# Patient Record
Sex: FEMALE | Race: BLACK OR AFRICAN AMERICAN | NOT HISPANIC OR LATINO | URBAN - METROPOLITAN AREA
[De-identification: names, ages, dates, MRNs, and addresses within clinical notes are randomized per-mention and may not be internally consistent; named-entity substitution may affect disease eponyms.]

---
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
## 2021-03-04 ENCOUNTER — EMERGENCY (EMERGENCY)
Facility: HOSPITAL | Age: 21
LOS: 1 days | Discharge: HOME | End: 2021-03-04
Attending: EMERGENCY MEDICINE | Admitting: EMERGENCY MEDICINE
Payer: COMMERCIAL

## 2021-03-04 VITALS
RESPIRATION RATE: 16 BRPM | OXYGEN SATURATION: 100 % | HEART RATE: 87 BPM | HEIGHT: 62.6 IN | DIASTOLIC BLOOD PRESSURE: 77 MMHG | SYSTOLIC BLOOD PRESSURE: 120 MMHG | TEMPERATURE: 98 F

## 2021-03-04 DIAGNOSIS — F32.9 MAJOR DEPRESSIVE DISORDER, SINGLE EPISODE, UNSPECIFIED: ICD-10-CM

## 2021-03-04 LAB
ALBUMIN SERPL ELPH-MCNC: 4.5 G/DL — SIGNIFICANT CHANGE UP (ref 3.3–5)
ALP SERPL-CCNC: 45 U/L — SIGNIFICANT CHANGE UP (ref 40–120)
ALT FLD-CCNC: 9 U/L — SIGNIFICANT CHANGE UP (ref 4–33)
AMPHET UR-MCNC: NEGATIVE — SIGNIFICANT CHANGE UP
ANION GAP SERPL CALC-SCNC: 13 MMOL/L — SIGNIFICANT CHANGE UP (ref 7–14)
APAP SERPL-MCNC: <15 UG/ML — SIGNIFICANT CHANGE UP (ref 15–25)
APPEARANCE UR: ABNORMAL
AST SERPL-CCNC: 13 U/L — SIGNIFICANT CHANGE UP (ref 4–32)
BACTERIA # UR AUTO: ABNORMAL
BARBITURATES UR SCN-MCNC: NEGATIVE — SIGNIFICANT CHANGE UP
BASOPHILS # BLD AUTO: 0.01 K/UL — SIGNIFICANT CHANGE UP (ref 0–0.2)
BASOPHILS NFR BLD AUTO: 0.2 % — SIGNIFICANT CHANGE UP (ref 0–2)
BENZODIAZ UR-MCNC: NEGATIVE — SIGNIFICANT CHANGE UP
BILIRUB SERPL-MCNC: <0.2 MG/DL — SIGNIFICANT CHANGE UP (ref 0.2–1.2)
BILIRUB UR-MCNC: NEGATIVE — SIGNIFICANT CHANGE UP
BUN SERPL-MCNC: 9 MG/DL — SIGNIFICANT CHANGE UP (ref 7–23)
CALCIUM SERPL-MCNC: 9.2 MG/DL — SIGNIFICANT CHANGE UP (ref 8.4–10.5)
CHLORIDE SERPL-SCNC: 104 MMOL/L — SIGNIFICANT CHANGE UP (ref 98–107)
CO2 SERPL-SCNC: 21 MMOL/L — LOW (ref 22–31)
COCAINE METAB.OTHER UR-MCNC: NEGATIVE — SIGNIFICANT CHANGE UP
COLOR SPEC: YELLOW — SIGNIFICANT CHANGE UP
CREAT SERPL-MCNC: 0.83 MG/DL — SIGNIFICANT CHANGE UP (ref 0.5–1.3)
CREATININE URINE RESULT, DAU: 146 MG/DL — SIGNIFICANT CHANGE UP
DIFF PNL FLD: ABNORMAL
EOSINOPHIL # BLD AUTO: 0.08 K/UL — SIGNIFICANT CHANGE UP (ref 0–0.5)
EOSINOPHIL NFR BLD AUTO: 1.7 % — SIGNIFICANT CHANGE UP (ref 0–6)
ETHANOL SERPL-MCNC: <10 MG/DL — SIGNIFICANT CHANGE UP
GLUCOSE SERPL-MCNC: 97 MG/DL — SIGNIFICANT CHANGE UP (ref 70–99)
GLUCOSE UR QL: NEGATIVE — SIGNIFICANT CHANGE UP
HCG SERPL-ACNC: <5 MIU/ML — SIGNIFICANT CHANGE UP
HCT VFR BLD CALC: 39.6 % — SIGNIFICANT CHANGE UP (ref 34.5–45)
HGB BLD-MCNC: 12.8 G/DL — SIGNIFICANT CHANGE UP (ref 11.5–15.5)
IANC: 2.17 K/UL — SIGNIFICANT CHANGE UP (ref 1.5–8.5)
IMM GRANULOCYTES NFR BLD AUTO: 0.2 % — SIGNIFICANT CHANGE UP (ref 0–1.5)
KETONES UR-MCNC: NEGATIVE — SIGNIFICANT CHANGE UP
LEUKOCYTE ESTERASE UR-ACNC: NEGATIVE — SIGNIFICANT CHANGE UP
LYMPHOCYTES # BLD AUTO: 1.95 K/UL — SIGNIFICANT CHANGE UP (ref 1–3.3)
LYMPHOCYTES # BLD AUTO: 42.2 % — SIGNIFICANT CHANGE UP (ref 13–44)
MCHC RBC-ENTMCNC: 28.4 PG — SIGNIFICANT CHANGE UP (ref 27–34)
MCHC RBC-ENTMCNC: 32.3 GM/DL — SIGNIFICANT CHANGE UP (ref 32–36)
MCV RBC AUTO: 87.8 FL — SIGNIFICANT CHANGE UP (ref 80–100)
METHADONE UR-MCNC: NEGATIVE — SIGNIFICANT CHANGE UP
MONOCYTES # BLD AUTO: 0.4 K/UL — SIGNIFICANT CHANGE UP (ref 0–0.9)
MONOCYTES NFR BLD AUTO: 8.7 % — SIGNIFICANT CHANGE UP (ref 2–14)
NEUTROPHILS # BLD AUTO: 2.17 K/UL — SIGNIFICANT CHANGE UP (ref 1.8–7.4)
NEUTROPHILS NFR BLD AUTO: 47 % — SIGNIFICANT CHANGE UP (ref 43–77)
NITRITE UR-MCNC: NEGATIVE — SIGNIFICANT CHANGE UP
NRBC # BLD: 0 /100 WBCS — SIGNIFICANT CHANGE UP
NRBC # FLD: 0 K/UL — SIGNIFICANT CHANGE UP
OPIATES UR-MCNC: NEGATIVE — SIGNIFICANT CHANGE UP
OXYCODONE UR-MCNC: NEGATIVE — SIGNIFICANT CHANGE UP
PCP SPEC-MCNC: SIGNIFICANT CHANGE UP
PCP UR-MCNC: NEGATIVE — SIGNIFICANT CHANGE UP
PH UR: 7 — SIGNIFICANT CHANGE UP (ref 5–8)
PLATELET # BLD AUTO: 192 K/UL — SIGNIFICANT CHANGE UP (ref 150–400)
POTASSIUM SERPL-MCNC: 3.9 MMOL/L — SIGNIFICANT CHANGE UP (ref 3.5–5.3)
POTASSIUM SERPL-SCNC: 3.9 MMOL/L — SIGNIFICANT CHANGE UP (ref 3.5–5.3)
PROT SERPL-MCNC: 7.4 G/DL — SIGNIFICANT CHANGE UP (ref 6–8.3)
PROT UR-MCNC: ABNORMAL
RBC # BLD: 4.51 M/UL — SIGNIFICANT CHANGE UP (ref 3.8–5.2)
RBC # FLD: 14.5 % — SIGNIFICANT CHANGE UP (ref 10.3–14.5)
SALICYLATES SERPL-MCNC: <5 MG/DL — LOW (ref 15–30)
SARS-COV-2 IGG SERPL QL IA: NEGATIVE — SIGNIFICANT CHANGE UP
SARS-COV-2 IGM SERPL IA-ACNC: 0.07 INDEX — SIGNIFICANT CHANGE UP
SODIUM SERPL-SCNC: 138 MMOL/L — SIGNIFICANT CHANGE UP (ref 135–145)
SP GR SPEC: 1.02 — SIGNIFICANT CHANGE UP (ref 1.01–1.02)
THC UR QL: POSITIVE
TOXICOLOGY SCREEN, DRUGS OF ABUSE, SERUM RESULT: SIGNIFICANT CHANGE UP
TSH SERPL-MCNC: 2.02 UIU/ML — SIGNIFICANT CHANGE UP (ref 0.27–4.2)
UROBILINOGEN FLD QL: SIGNIFICANT CHANGE UP
WBC # BLD: 4.62 K/UL — SIGNIFICANT CHANGE UP (ref 3.8–10.5)
WBC # FLD AUTO: 4.62 K/UL — SIGNIFICANT CHANGE UP (ref 3.8–10.5)

## 2021-03-04 PROCEDURE — 99285 EMERGENCY DEPT VISIT HI MDM: CPT | Mod: 25

## 2021-03-04 PROCEDURE — 90792 PSYCH DIAG EVAL W/MED SRVCS: CPT

## 2021-03-04 PROCEDURE — 93010 ELECTROCARDIOGRAM REPORT: CPT

## 2021-03-04 NOTE — ED BEHAVIORAL HEALTH ASSESSMENT NOTE - DETAILS
Dad - Depression, Anxiety, PTSD pt self referred, mother aware, d/w Dr. Soto see  Safety Plan passive SI "don't want to be here" reports emotional abuse

## 2021-03-04 NOTE — ED PROVIDER NOTE - PROGRESS NOTE DETAILS
Dr. Soto: Pt states feeling better. Pt was seen by Psych who felt pt did not need admission at this time. [facility] for follow up.

## 2021-03-04 NOTE — ED ADULT TRIAGE NOTE - CHIEF COMPLAINT QUOTE
Pt states that she has been feeling depressed and having thoughts of suicide for the past few months. Calm and cooperative Past medical history: depression with psych admission, last in 2016

## 2021-03-04 NOTE — ED PROVIDER NOTE - PMH
Joint pain    Mitral valve prolapse    ODD (oppositional defiant disorder)    Seizure    Seizure

## 2021-03-04 NOTE — ED BEHAVIORAL HEALTH ASSESSMENT NOTE - SUMMARY
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 Pt called EMS herself for worsening passive suicidal ideation and depression.   Pt presents as depressed in the context of multiple psychosocial stressors and reports passive SI for the last few months in the context of these stressors. She however remains future oriented, able to engage in safety planning, denies any active si/i/p and is not exhibiting any signs and symptoms of braden or psychosis. Pt refusing voluntary hospitalization at this time and does not meet criteria for involuntary hospitalization.

## 2021-03-04 NOTE — ED PROVIDER NOTE - PATIENT PORTAL LINK FT
You can access the FollowMyHealth Patient Portal offered by [facility] by registering at the following website: [email or web address]. By joining [facility]’s FollowMyHealth portal, you will also be able to view your health information using other applications (apps) compatible with our system.

## 2021-03-04 NOTE — ED PROVIDER NOTE - NSFOLLOWUPINSTRUCTIONS_ED_ALL_ED_FT
Rest and find ways to reduce stress.    Follow up with [facility]: [phone]    Return for any worsening symptoms.

## 2021-03-04 NOTE — ED BEHAVIORAL HEALTH ASSESSMENT NOTE - DESCRIPTION
Calm and cooperative. no PRNS were given.     Vital Signs Last 24 Hrs  T(C): 36.7 (04 Mar 2021 09:07), Max: 36.7 (04 Mar 2021 09:07)  T(F): 98 (04 Mar 2021 09:07), Max: 98 (04 Mar 2021 09:07)  HR: 87 (04 Mar 2021 09:07) (87 - 87)  BP: 120/77 (04 Mar 2021 09:07) (120/77 - 120/77)  BP(mean): --  RR: 16 (04 Mar 2021 09:07) (16 - 16)  SpO2: 100% (04 Mar 2021 09:07) (100% - 100%) Lives with mother, Joes[employer] - studying mortuary science/social work none

## 2021-03-04 NOTE — ED BEHAVIORAL HEALTH ASSESSMENT NOTE - HPI (INCLUDE ILLNESS QUALITY, SEVERITY, DURATION, TIMING, CONTEXT, MODIFYING FACTORS, ASSOCIATED SIGNS AND SYMPTOMS)
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 Pt called EMS herself for worsening passive suicidal ideation and depression.     Pt states that today she was having an argument with her mother and stated that she's been having suicidal thoughts for a few months. She denies them being active but states they're in her head of "Not wanting to be here". She reports since November she's been doing poorly in school, there have been multiple deaths in her family and she's been feeling overwhelmed. She is usually a straight A student but has had to take a mental health leave from school. 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 She has been functioning in terms of caring for self and ADLs but admits poor sleep (stays awake for 3 days out of the week) and admits to poor energy and feeling physically tired. She also admits to overeating recently with no associated weight gain. Patient admits to constant interpersonal conflict with her mother and feels that there's a lot of pressure on her. Pt wants a future, and a career and denies having any desire to end her life.     She admits to smoking marijuana approximately once a month for her "seizures" and has not had an episode since November. Patient is also denying any paranoid delusion including AH/VH or HI/I/P. No evidence of manic symptoms at this time.     Isela (Mother) [phone] - Pt made a 911 call for EMS to come. She's been having suicidal thoughts for the last 5 months. Diagnosis of MDD and Conversion Disorder. Patient has had a couple of admissions at [facility] and [facility] when she was younger. Patient has not been doing well but she is functioning. She's not doing well at school. She works approximately 15 hours but has been doing well at work as per Supervisor. Pt is trying in life. She's depressed and she's been going through a lot. Mom acknowledges that she has expectations but feels that they're appropriate for a 21 year old. She feels that patient has been lying about working/going to school. Patient has been manipulative with mom and when mom tries to set limits she makes suicidal statements and gets physically aggressive. They have tried family counseling in the past but patient would not engage. Mother does not feel patient is committed to treatment and only says things to get what she wants.

## 2021-03-04 NOTE — ED BEHAVIORAL HEALTH ASSESSMENT NOTE - RISK ASSESSMENT
Risk factors: Mood episode, anxiety, history of trauma, impulsivity, cluster B personality traits, not engaged in outpatient psychiatric treatment, strained or limited social supports.  Protective factors: Young, medically stable, no current active SI/HI/I/P or urges to harm self/others, no history of suicide attempts, responsibility to children/family/pets/others, identifies reasons for living, future orientation, engagement in work or school, abstinence from substances, residential stability, no access to firearms, good insight into illness/need for treatment, help seeking behaviors, and able to engage in safety planning. Moderate Acute Suicide Risk

## 2021-03-04 NOTE — ED PROVIDER NOTE - OBJECTIVE STATEMENT
20 y/o female with PMHx of Depression who presented to the ED for Depression and SI X 4 months. Pt states over the past 4 months having depression brought on by school and a recent death. Pt notes having increased depressed feelings with thoughts of SI. Pt denies any plan. Denies any HI or hallucinations. Pt has had previous admission for depression. Pt denies any headache, fever, chills, nausea, vomiting, SOB, chest pain, or abd pain.

## 2021-03-04 NOTE — ED ADULT NURSE NOTE - OBJECTIVE STATEMENT
Pt states that she has been feeling depressed and having thoughts of suicide for the past few months. Calm and cooperative Past medical history: depression with psych admission, last in 2016  depression

## 2021-03-04 NOTE — ED PROVIDER NOTE - CLINICAL SUMMARY MEDICAL DECISION MAKING FREE TEXT BOX
22 y/o female with PMHx of Depression who presented to the ED for Depression and SI X 4 months.   Concern for depression and SI  Labs, Psych eval

## 2021-03-04 NOTE — ED BEHAVIORAL HEALTH ASSESSMENT NOTE - OTHER PAST PSYCHIATRIC HISTORY (INCLUDE DETAILS REGARDING ONSET, COURSE OF ILLNESS, INPATIENT/OUTPATIENT TREATMENT)
2 past psych hospitalizations.   [facility] - 04/23/2016-04/29/2016  Not currently in treatment.

## 2021-07-18 ENCOUNTER — EMERGENCY (EMERGENCY)
Facility: HOSPITAL | Age: 21
LOS: 1 days | Discharge: HOME | End: 2021-07-18
Attending: EMERGENCY MEDICINE | Admitting: EMERGENCY MEDICINE
Payer: COMMERCIAL

## 2021-07-18 VITALS
DIASTOLIC BLOOD PRESSURE: 82 MMHG | TEMPERATURE: 99 F | OXYGEN SATURATION: 100 % | RESPIRATION RATE: 14 BRPM | SYSTOLIC BLOOD PRESSURE: 123 MMHG | HEART RATE: 82 BPM

## 2021-07-18 VITALS
SYSTOLIC BLOOD PRESSURE: 144 MMHG | TEMPERATURE: 98 F | OXYGEN SATURATION: 97 % | HEART RATE: 117 BPM | RESPIRATION RATE: 18 BRPM | DIASTOLIC BLOOD PRESSURE: 97 MMHG | HEIGHT: 62.6 IN

## 2021-07-18 LAB
ALBUMIN SERPL ELPH-MCNC: 4.6 G/DL — SIGNIFICANT CHANGE UP (ref 3.3–5)
ALP SERPL-CCNC: 47 U/L — SIGNIFICANT CHANGE UP (ref 40–120)
ALT FLD-CCNC: 11 U/L — SIGNIFICANT CHANGE UP (ref 4–33)
ANION GAP SERPL CALC-SCNC: 16 MMOL/L — HIGH (ref 7–14)
AST SERPL-CCNC: 13 U/L — SIGNIFICANT CHANGE UP (ref 4–32)
BASOPHILS # BLD AUTO: 0.01 K/UL — SIGNIFICANT CHANGE UP (ref 0–0.2)
BASOPHILS NFR BLD AUTO: 0.2 % — SIGNIFICANT CHANGE UP (ref 0–2)
BILIRUB SERPL-MCNC: <0.2 MG/DL — SIGNIFICANT CHANGE UP (ref 0.2–1.2)
BUN SERPL-MCNC: 12 MG/DL — SIGNIFICANT CHANGE UP (ref 7–23)
CALCIUM SERPL-MCNC: 9.2 MG/DL — SIGNIFICANT CHANGE UP (ref 8.4–10.5)
CHLORIDE SERPL-SCNC: 104 MMOL/L — SIGNIFICANT CHANGE UP (ref 98–107)
CO2 SERPL-SCNC: 17 MMOL/L — LOW (ref 22–31)
CREAT SERPL-MCNC: 0.77 MG/DL — SIGNIFICANT CHANGE UP (ref 0.5–1.3)
EOSINOPHIL # BLD AUTO: 0.1 K/UL — SIGNIFICANT CHANGE UP (ref 0–0.5)
EOSINOPHIL NFR BLD AUTO: 1.8 % — SIGNIFICANT CHANGE UP (ref 0–6)
GLUCOSE SERPL-MCNC: 94 MG/DL — SIGNIFICANT CHANGE UP (ref 70–99)
HCG SERPL-ACNC: <5 MIU/ML — SIGNIFICANT CHANGE UP
HCT VFR BLD CALC: 41.7 % — SIGNIFICANT CHANGE UP (ref 34.5–45)
HGB BLD-MCNC: 13.2 G/DL — SIGNIFICANT CHANGE UP (ref 11.5–15.5)
IANC: 2.64 K/UL — SIGNIFICANT CHANGE UP (ref 1.5–8.5)
IMM GRANULOCYTES NFR BLD AUTO: 0.2 % — SIGNIFICANT CHANGE UP (ref 0–1.5)
LYMPHOCYTES # BLD AUTO: 2.26 K/UL — SIGNIFICANT CHANGE UP (ref 1–3.3)
LYMPHOCYTES # BLD AUTO: 40.2 % — SIGNIFICANT CHANGE UP (ref 13–44)
MCHC RBC-ENTMCNC: 28.1 PG — SIGNIFICANT CHANGE UP (ref 27–34)
MCHC RBC-ENTMCNC: 31.7 GM/DL — LOW (ref 32–36)
MCV RBC AUTO: 88.7 FL — SIGNIFICANT CHANGE UP (ref 80–100)
MONOCYTES # BLD AUTO: 0.6 K/UL — SIGNIFICANT CHANGE UP (ref 0–0.9)
MONOCYTES NFR BLD AUTO: 10.7 % — SIGNIFICANT CHANGE UP (ref 2–14)
NEUTROPHILS # BLD AUTO: 2.64 K/UL — SIGNIFICANT CHANGE UP (ref 1.8–7.4)
NEUTROPHILS NFR BLD AUTO: 46.9 % — SIGNIFICANT CHANGE UP (ref 43–77)
NRBC # BLD: 0 /100 WBCS — SIGNIFICANT CHANGE UP
NRBC # FLD: 0 K/UL — SIGNIFICANT CHANGE UP
PLATELET # BLD AUTO: 214 K/UL — SIGNIFICANT CHANGE UP (ref 150–400)
POTASSIUM SERPL-MCNC: 3.7 MMOL/L — SIGNIFICANT CHANGE UP (ref 3.5–5.3)
POTASSIUM SERPL-SCNC: 3.7 MMOL/L — SIGNIFICANT CHANGE UP (ref 3.5–5.3)
PROT SERPL-MCNC: 7.2 G/DL — SIGNIFICANT CHANGE UP (ref 6–8.3)
RBC # BLD: 4.7 M/UL — SIGNIFICANT CHANGE UP (ref 3.8–5.2)
RBC # FLD: 14 % — SIGNIFICANT CHANGE UP (ref 10.3–14.5)
SODIUM SERPL-SCNC: 137 MMOL/L — SIGNIFICANT CHANGE UP (ref 135–145)
WBC # BLD: 5.62 K/UL — SIGNIFICANT CHANGE UP (ref 3.8–10.5)
WBC # FLD AUTO: 5.62 K/UL — SIGNIFICANT CHANGE UP (ref 3.8–10.5)

## 2021-07-18 PROCEDURE — 99284 EMERGENCY DEPT VISIT MOD MDM: CPT | Mod: 25

## 2021-07-18 PROCEDURE — 93010 ELECTROCARDIOGRAM REPORT: CPT

## 2021-07-18 NOTE — ED PROVIDER NOTE - NS ED ROS FT
GENERAL: No fever, chills  EYES: no vision changes, no discharge.   ENT: no difficulty swallowing or speaking   CARDIAC: no chest pain/pressure, SOB, lower extremity swelling  PULMONARY: no cough, SOB  GI: no abdominal pain, n/v/d  : no dysuria  SKIN: no rashes  NEURO: no headache, lightheadedness, paresthesia. +seizure   MSK: No joint pain, myalgia, weakness.

## 2021-07-18 NOTE — ED ADULT TRIAGE NOTE - CHIEF COMPLAINT QUOTE
pt arrives w/ c/o seizures. pt was on [facility] bus when she had witnessed seizure. pt denies any head trauma. pt states she called EMS because she could not recall the events or what happened earlier in the day. pt states was on medication but taken off of it about 5 years ago. pt presently awake , alert. speaking in full sentences.

## 2021-07-18 NOTE — ED PROVIDER NOTE - PHYSICAL EXAMINATION
Ashley Hawley MD  GENERAL: Patient awake alert NAD.  HEENT: NC/AT, Moist mucous membranes, PERRL, EOMI.  LUNGS: CTAB, no wheezes or crackles.   CARDIAC: RRR, no m/r/g.    ABDOMEN: Soft, NT, ND, No rebound, guarding. No CVA tenderness.   EXT: No edema. No calf tenderness.   MSK: No spinal tenderness, no pain with movement, no deformities.  NEURO: A&Ox3. Moving all extremities. cn 2-12 intact. bilateral strength and sensation intact. finger nose intact  SKIN: Warm and dry. No rash.  PSYCH: Normal affect.

## 2021-07-18 NOTE — ED ADULT NURSE NOTE - NSIMPLEMENTINTERV_GEN_ALL_ED
Implemented All Fall Risk Interventions:  [place] to call system. Call bell, personal items and telephone within reach. Instruct patient to call for assistance. Room bathroom lighting operational. Non-slip footwear when patient is off stretcher. Physically safe environment: no spills, clutter or unnecessary equipment. Stretcher in lowest position, wheels locked, appropriate side rails in place. Provide visual cue, wrist band, yellow gown, etc. Monitor gait and stability. Monitor for mental status changes and reorient to person, place, and time. Review medications for side effects contributing to fall risk. Reinforce activity limits and safety measures with patient and family.

## 2021-07-18 NOTE — ED PROVIDER NOTE - OBJECTIVE STATEMENT
20 yo F with a h/o conversion disorder (pseudo seizures) who presents with 20 yo F with a h/o conversion disorder (pseudo seizures) who presents with seizure-like activity. She was on the bus on her way home from work, was sitting down, had a metallic taste in her mouth as an aura, and then reportedly had a seizure though she does not remember the incidence. EMS brought her in. She did not bite her tongue, did have urinary incontinence. Did have post ictal state - says she felt confused for 30 min after and has trouble remembering the rest of the day. Last pseudo seizure was 1 yr ago. Pt does not follow with neuro or psych. Was diagnosed with conversion disorder 4 yrs ago and anti epileptics stopped at that time. denies recent illness, SOB, chest pain, HA, hematuria, dysuria, abd pain, emesis. denies alcohol or drug use 22 yo F with a h/o depression and conversion disorder (pseudo seizures) who presents with seizure-like activity. She was on the bus on her way home from work, was sitting down, had a metallic taste in her mouth as an aura, and then reportedly had a seizure though she does not remember the incidence. EMS brought her in. She did not bite her tongue, did have urinary incontinence. Did have post ictal state - says she felt confused for 30 min after and has trouble remembering the rest of the day. Last pseudo seizure was 1 yr ago. Pt does not follow with neuro or psych. Was diagnosed with conversion disorder 4 yrs ago and anti epileptics stopped at that time. denies recent illness, SOB, chest pain, HA, hematuria, dysuria, abd pain, emesis. denies alcohol or drug use

## 2021-07-18 NOTE — ED PROVIDER NOTE - ATTENDING CONTRIBUTION TO CARE
I performed a face-to-face evaluation of the patient and performed a history and physical examination. I agree with the history and physical examination.    Rhina: H/o pseudoseizures. Not on AEDs. Had sz. or pseudoseizure on bus today. No head injury. Will check labs, HCG, EKG. F/u Neuro.

## 2021-07-18 NOTE — ED ADULT NURSE NOTE - OBJECTIVE STATEMENT
Pt is a 21yr old female, A&OX4 and ambulatory, hx of seizures, c/o of a witnessed seizure on the [facility] coming home from work. As per mother, pt was ruled out to have "psychological seizures and not neurological seizures at [facility] and [facility]". Last seizure was about a year and a half ago. Endorses HA. Unsure if she hit her head, denies blood thinners. Pt endorsed a metallic taste prior to seizure but does not remember much of the event. Resp. even and unlabored. Denies any life stressors. Denies drug or alcohol use. Denies CP, SOB, dizziness, vision changes, abd. pain, N/V, fever/chills, cough, and urinary symptoms. 20g IV placed to the R hand. Labs sent. NSR on the . NAD.

## 2021-07-18 NOTE — ED PROVIDER NOTE - PMH
Joint pain    Mitral valve prolapse    ODD (oppositional defiant disorder)    Seizure    Seizure    Seizure disorder

## 2021-07-18 NOTE — ED PROVIDER NOTE - PROGRESS NOTE DETAILS
pt reassessed. feeling well. discussed need for neuro and psych follow up. discussed return precautions

## 2021-07-18 NOTE — ED ADULT NURSE NOTE - CHIEF COMPLAINT QUOTE
pt arrives w/ c/o seizures. pt was on [facility] bus when she had witnessed seizure. pt denies any head trauma. pt states she called EMS because she could not recall the events or what happened earlier in the day. pt states was on medication but taken off of it about 5 years ago. pt presently awake , alert. speaking in full sentences.

## 2021-07-18 NOTE — ED PROVIDER NOTE - NSFOLLOWUPCLINICS_GEN_ALL_ED_FT
[facility]  Neurology  [address] [place] [address] - [address] [place] [address]  Phone: [phone]  Fax:     [facility]  Psychiatry  [address] [place] [id number]  Phone: [phone]  Fax:

## 2021-07-18 NOTE — ED PROVIDER NOTE - NSFOLLOWUPINSTRUCTIONS_ED_ALL_ED_FT
You were seen in the ED for seizure-like activity.  This is most likely your known conversion disorder. Your physical exam, blood work, and ekg here were reassuring.  You need to follow up with a neurologist in 2-3 days for further management. You should also follow up with a psychiatrist.    Please follow up with your PCP in 2-3 days. Return to the ED if you experience any worsening or new symptoms or any symptoms that concern you, including fevers, chills, shortness of breath, chest pain, new seizure like activity, weakness, numbness, confusion.

## 2021-07-18 NOTE — ED PROVIDER NOTE - PATIENT PORTAL LINK FT
You can access the FollowMyHealth Patient Portal offered by [facility] by registering at the following website: [email or web address]. By joining [facility]’s FollowMyHealth portal, you will also be able to view your health information using other applications (apps) compatible with our system.

## 2021-07-18 NOTE — ED PROVIDER NOTE - CLINICAL SUMMARY MEDICAL DECISION MAKING FREE TEXT BOX
Rhina: H/o pseudoseizures. Not on AEDs. Had sz. or pseudoseizure on bus today. No head injury. Will check labs, HCG, EKG. F/u Neuro.

## 2021-07-19 PROBLEM — G40.909 EPILEPSY, UNSPECIFIED, NOT INTRACTABLE, WITHOUT STATUS EPILEPTICUS: Chronic | Status: ACTIVE | Noted: 2020-05-08

## 2022-01-03 NOTE — ED ADULT NURSE NOTE - EXTENSIONS OF SELF_ADULT
Patient reports that his agitation, shaking and sweating have all improved since stopping lexapro and would like to start a different medication. Patient complains that anxiety has increased since stopping.    None

## 2022-09-21 NOTE — ED BEHAVIORAL HEALTH ASSESSMENT NOTE - NS ED BHA DEMOGRAPHICS RACE
SURVEY:    You may be receiving a survey from [employer] regarding your visit today. Please complete the survey to enable us to provide the highest quality of care to you and your family. If you cannot score us a very good on any question, please call the office to discuss how we could have made your experience a very good one. Thank you.
 / Black

## 2022-11-23 NOTE — BH SAFETY PLAN - INTERNAL COPING STRATEGY 3
Reason for Call:  Other call back    Detailed comments: patient was tested at home for covid and came back as positive. She is currently stable. If there is any chances Assistant living will call and update.    Phone Number Patient can be reached at: Other phone number:  [phone]    Best Time: any    Can we leave a detailed message on this number? YES    Call taken on 11/23/2022 at 11:05 AM by Lilibeth Whyte       walking/meditate

## 2023-02-08 NOTE — ED BEHAVIORAL HEALTH ASSESSMENT NOTE - ADULT OR CHILD PROTECTIVE SERVICES INVOLVEMENT
[Normal] : normal bowel sounds, non-tender, no masses, soft, no no hepato-splenomegaly
My signature below certifies that the above stated patient is homebound and upon completion of the Face-To-Face encounter, has the need for intermittent skilled nursing, physical therapy and/or speech or occupational therapy services in their home for their current diagnosis as outlined in their initial plan of care. These services will continue to be monitored by myself or another physician.
No

## 2023-02-21 NOTE — ED PROVIDER NOTE - OBJECTIVE STATEMENT
Quality 111:Pneumonia Vaccination Status For Older Adults: Pneumococcal vaccine (PPSV23) administered on or after patient’s 60th birthday and before the end of the measurement period Detail Level: Detailed Quality 110: Preventive Care And Screening: Influenza Immunization: Influenza immunization was not ordered or administered, reason not given Pt BIBA b/c as per mother pt threatened to "jump outside of a window". Mother states ACS at home today and pt became argumentative and stated she would jump out of a window.  Denies SI/HI. denies drug and alcohol use.

## 2023-04-25 ENCOUNTER — EMERGENCY (EMERGENCY)
Facility: HOSPITAL | Age: 23
LOS: 1 days | Discharge: HOME | End: 2023-04-25
Attending: EMERGENCY MEDICINE | Admitting: EMERGENCY MEDICINE
Payer: COMMERCIAL

## 2023-04-25 VITALS
OXYGEN SATURATION: 96 % | RESPIRATION RATE: 16 BRPM | DIASTOLIC BLOOD PRESSURE: 84 MMHG | HEART RATE: 84 BPM | TEMPERATURE: 98 F | SYSTOLIC BLOOD PRESSURE: 138 MMHG

## 2023-04-25 VITALS
OXYGEN SATURATION: 100 % | TEMPERATURE: 98 F | DIASTOLIC BLOOD PRESSURE: 86 MMHG | SYSTOLIC BLOOD PRESSURE: 128 MMHG | HEART RATE: 79 BPM | RESPIRATION RATE: 18 BRPM

## 2023-04-25 LAB
ALBUMIN SERPL ELPH-MCNC: 4.2 G/DL — SIGNIFICANT CHANGE UP (ref 3.3–5)
ALP SERPL-CCNC: 44 U/L — SIGNIFICANT CHANGE UP (ref 40–120)
ALT FLD-CCNC: 9 U/L — SIGNIFICANT CHANGE UP (ref 4–33)
ANION GAP SERPL CALC-SCNC: 11 MMOL/L — SIGNIFICANT CHANGE UP (ref 7–14)
APTT BLD: 29.9 SEC — SIGNIFICANT CHANGE UP (ref 27–36.3)
AST SERPL-CCNC: 11 U/L — SIGNIFICANT CHANGE UP (ref 4–32)
BASOPHILS # BLD AUTO: 0.02 K/UL — SIGNIFICANT CHANGE UP (ref 0–0.2)
BASOPHILS NFR BLD AUTO: 0.3 % — SIGNIFICANT CHANGE UP (ref 0–2)
BILIRUB SERPL-MCNC: <0.2 MG/DL — SIGNIFICANT CHANGE UP (ref 0.2–1.2)
BLD GP AB SCN SERPL QL: NEGATIVE — SIGNIFICANT CHANGE UP
BUN SERPL-MCNC: 10 MG/DL — SIGNIFICANT CHANGE UP (ref 7–23)
CALCIUM SERPL-MCNC: 9.1 MG/DL — SIGNIFICANT CHANGE UP (ref 8.4–10.5)
CHLORIDE SERPL-SCNC: 104 MMOL/L — SIGNIFICANT CHANGE UP (ref 98–107)
CO2 SERPL-SCNC: 22 MMOL/L — SIGNIFICANT CHANGE UP (ref 22–31)
CREAT SERPL-MCNC: 0.81 MG/DL — SIGNIFICANT CHANGE UP (ref 0.5–1.3)
EGFR: 105 ML/MIN/1.73M2 — SIGNIFICANT CHANGE UP
EOSINOPHIL # BLD AUTO: 0.13 K/UL — SIGNIFICANT CHANGE UP (ref 0–0.5)
EOSINOPHIL NFR BLD AUTO: 2 % — SIGNIFICANT CHANGE UP (ref 0–6)
GLUCOSE SERPL-MCNC: 91 MG/DL — SIGNIFICANT CHANGE UP (ref 70–99)
HCT VFR BLD CALC: 37.2 % — SIGNIFICANT CHANGE UP (ref 34.5–45)
HGB BLD-MCNC: 12.2 G/DL — SIGNIFICANT CHANGE UP (ref 11.5–15.5)
IANC: 3.56 K/UL — SIGNIFICANT CHANGE UP (ref 1.8–7.4)
IMM GRANULOCYTES NFR BLD AUTO: 0.3 % — SIGNIFICANT CHANGE UP (ref 0–0.9)
INR BLD: 1.04 RATIO — SIGNIFICANT CHANGE UP (ref 0.88–1.16)
LYMPHOCYTES # BLD AUTO: 2.49 K/UL — SIGNIFICANT CHANGE UP (ref 1–3.3)
LYMPHOCYTES # BLD AUTO: 37.7 % — SIGNIFICANT CHANGE UP (ref 13–44)
MCHC RBC-ENTMCNC: 28.4 PG — SIGNIFICANT CHANGE UP (ref 27–34)
MCHC RBC-ENTMCNC: 32.8 GM/DL — SIGNIFICANT CHANGE UP (ref 32–36)
MCV RBC AUTO: 86.5 FL — SIGNIFICANT CHANGE UP (ref 80–100)
MONOCYTES # BLD AUTO: 0.38 K/UL — SIGNIFICANT CHANGE UP (ref 0–0.9)
MONOCYTES NFR BLD AUTO: 5.8 % — SIGNIFICANT CHANGE UP (ref 2–14)
NEUTROPHILS # BLD AUTO: 3.56 K/UL — SIGNIFICANT CHANGE UP (ref 1.8–7.4)
NEUTROPHILS NFR BLD AUTO: 53.9 % — SIGNIFICANT CHANGE UP (ref 43–77)
NRBC # BLD: 0 /100 WBCS — SIGNIFICANT CHANGE UP (ref 0–0)
NRBC # FLD: 0 K/UL — SIGNIFICANT CHANGE UP (ref 0–0)
PLATELET # BLD AUTO: 211 K/UL — SIGNIFICANT CHANGE UP (ref 150–400)
POTASSIUM SERPL-MCNC: 3.7 MMOL/L — SIGNIFICANT CHANGE UP (ref 3.5–5.3)
POTASSIUM SERPL-SCNC: 3.7 MMOL/L — SIGNIFICANT CHANGE UP (ref 3.5–5.3)
PROT SERPL-MCNC: 6.9 G/DL — SIGNIFICANT CHANGE UP (ref 6–8.3)
PROTHROM AB SERPL-ACNC: 12.1 SEC — SIGNIFICANT CHANGE UP (ref 10.5–13.4)
RBC # BLD: 4.3 M/UL — SIGNIFICANT CHANGE UP (ref 3.8–5.2)
RBC # FLD: 13.6 % — SIGNIFICANT CHANGE UP (ref 10.3–14.5)
RH IG SCN BLD-IMP: POSITIVE — SIGNIFICANT CHANGE UP
SODIUM SERPL-SCNC: 137 MMOL/L — SIGNIFICANT CHANGE UP (ref 135–145)
WBC # BLD: 6.6 K/UL — SIGNIFICANT CHANGE UP (ref 3.8–10.5)
WBC # FLD AUTO: 6.6 K/UL — SIGNIFICANT CHANGE UP (ref 3.8–10.5)

## 2023-04-25 PROCEDURE — 99284 EMERGENCY DEPT VISIT MOD MDM: CPT

## 2023-04-25 PROCEDURE — 76830 TRANSVAGINAL US NON-OB: CPT | Mod: 26

## 2023-04-25 NOTE — ED PROVIDER NOTE - OBJECTIVE STATEMENT
22 y/o female with c/o vag bld x1 wk.  Heavier than usual menses; approx 1 pad per hour.  States she was not due for this menstrual period for another week.  She saw her ob/gyn today, Dr. Shankar, who sent her to the ED for high blood pressure (contrary to triage note which states hypotension).  She feels slightly more tired than usual.  Has had heavy vaginal bleeding in the past and was on ocp's briefly but discontinued as they did not help much.  Has never needed a blood transfusion; not on iron supp.  States has been sexually active with one male partner in the past but not recently.  Does not currently take any home medications.  Uses tobacco, denies etoh or illegal drug use.

## 2023-04-25 NOTE — ED ADULT NURSE NOTE - OBJECTIVE STATEMENT
Pt received to intake room 7. Pt A&OX4, ambulatory at baseline. Pt c/o vaginal bleeding x1 week. LMP was end of february. States she is saturating approx 2 pads an hour. States that she has had heavy bleeding in past but never required a blood transfusion. RR even and unlabored, NAD noted. IV access established with 20G to R hand, labs collected and sent as per orders. Safety in place, pending results.

## 2023-04-25 NOTE — ED PROVIDER NOTE - PATIENT PORTAL LINK FT
You can access the FollowMyHealth Patient Portal offered by [facility] by registering at the following website: [email or web address]. By joining [employer]’s FollowMyHealth portal, you will also be able to view your health information using other applications (apps) compatible with our system.

## 2023-04-25 NOTE — ED PROVIDER NOTE - NSICDXPASTMEDICALHX_GEN_ALL_CORE_FT
PAST MEDICAL HISTORY:  Joint pain     Mitral valve prolapse     ODD (oppositional defiant disorder)     Seizure     Seizure     Seizure disorder

## 2023-04-25 NOTE — ED ADULT NURSE NOTE - CHIEF COMPLAINT QUOTE
patient sent by obgyn for hypotension. Pt went to OBGYN for vaginal bleeding for pad every hour for 4 days. PMH epilepsy.

## 2023-04-25 NOTE — ED PROVIDER NOTE - CLINICAL SUMMARY MEDICAL DECISION MAKING FREE TEXT BOX
22 y/o female with c/o early and heavy menses.  Evaluate for anemia pregnancy fibroids hemm cyst.  Obtain cbc cmp t&s coags tvus reassess.

## 2024-01-10 NOTE — ED PROVIDER NOTE - DATA REVIEWED, MDM
STANDARD NOTE    Name: Natasha Cantu  Age: 13 year old  Gender: female      Chief Complaint   Patient presents with    Well Child     Patient  presents for 6th grade PE.  Patient is generally in good health.  School is going well.  She is not yet menstruating.  Mom with no concerns.  Due for 6th grade vaccines.  No other issues.        No current outpatient medications on file.     No current facility-administered medications for this visit.       ALLERGIES:  Patient has no known allergies.    History reviewed. No pertinent past medical history.    History reviewed. No pertinent surgical history.     Family History   Problem Relation Age of Onset    Hypertension Maternal Grandmother     Heart disease Paternal Grandmother        Social History     Socioeconomic History    Marital status: Single     Spouse name: Not on file    Number of children: Not on file    Years of education: Not on file    Highest education level: Not on file   Occupational History    Not on file   Tobacco Use    Smoking status: Never    Smokeless tobacco: Never   Vaping Use    Vaping Use: never used   Substance and Sexual Activity    Alcohol use: Never    Drug use: Never    Sexual activity: Not on file   Other Topics Concern    Not on file   Social History Narrative    Not on file     Social Determinants of Health     Financial Resource Strain: Not on file   Food Insecurity: Not on file   Transportation Needs: Not on file   Physical Activity: Not on file   Stress: Not on file   Social Connections: Not on file   Interpersonal Safety: Not on file         Review of Systems   Constitutional: Negative.    Genitourinary: Negative.        Visit Vitals  /63   Pulse 104   Temp 97.3 °F (36.3 °C) (Tympanic)   Resp 18   Ht 4' 11\" (1.499 m)   Wt 67.1 kg (148 lb 0.6 oz)   BMI 29.90 kg/m²       Physical Exam  Constitutional:       General: She is active. She is not in acute distress.  HENT:      Right Ear: Tympanic membrane and ear canal normal.       Left Ear: Tympanic membrane and ear canal normal.   Cardiovascular:      Rate and Rhythm: Normal rate and regular rhythm.   Pulmonary:      Effort: Pulmonary effort is normal.      Breath sounds: Normal breath sounds.   Neurological:      Mental Status: She is alert and oriented for age.   Psychiatric:         Mood and Affect: Mood normal.         No visits with results within 1 Month(s) from this visit.   Latest known visit with results is:   No results found for any previous visit.       Assessment and Plan  1. Need for HPV vaccination  -     HPV 9 VALENT VACC   Repeat HPV vaccine in 6 months  2. Need for meningitis vaccination  -     MENINGOCOCCAL CONJUGATE MCV4O VACC (MENVEO)  3. Need for Tdap vaccination  -     TETANUS DIPHTHERIA ACELLULAR PERTUSSIS VACC, 10+ YRS (BOOSTRIX)  4. Encounter for routine child health examination without abnormal findings    Follow up as directed as discussed with patient's mother      Patient Education:Medical Compliance with Plan Discussed and Risk of Non-compliance Reviewed.     vital signs